# Patient Record
Sex: MALE
[De-identification: names, ages, dates, MRNs, and addresses within clinical notes are randomized per-mention and may not be internally consistent; named-entity substitution may affect disease eponyms.]

---

## 2020-06-27 ENCOUNTER — HOSPITAL ENCOUNTER (EMERGENCY)
Dept: HOSPITAL 41 - JD.ED | Age: 30
Discharge: HOME | End: 2020-06-27
Payer: COMMERCIAL

## 2020-06-27 DIAGNOSIS — H10.31: ICD-10-CM

## 2020-06-27 DIAGNOSIS — F17.210: ICD-10-CM

## 2020-06-27 DIAGNOSIS — S05.01XA: Primary | ICD-10-CM
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
## 2020-06-27 PROCEDURE — 99283 EMERGENCY DEPT VISIT LOW MDM: CPT

## 2020-06-27 NOTE — EDM.PDOC
ED HPI GENERAL MEDICAL PROBLEM





- General


Chief Complaint: Eye Problems


Stated Complaint: PIECE OF ROCK IN THIRD EYE


Time Seen by Provider: 06/27/20 22:52


Source of Information: Reports: Patient


History Limitations: Reports: No Limitations





- History of Present Illness


INITIAL COMMENTS - FREE TEXT/NARRATIVE: 





This is a 30-year-old male.  He says on Thursday he might of gotten some sand in

his eye or something.  He thought he got it out but it is continued to bother 

him since that time and he comes to the ER this evening complaining of pain and 

discharge from his right eye.  He says his eye is very sensitive to light.  He 

denies any other acute symptoms.  He has continued to rub it because of the pain

and discomfort.  Is a little bit of drainage on his eyelashes on that right eye.

 His left eye is normal.





- Related Data


                                    Allergies











Allergy/AdvReac Type Severity Reaction Status Date / Time


 


No Known Allergies Allergy   Verified 06/27/20 22:37














Past Medical History


HEENT History: Reports: Impaired Vision





- Infectious Disease History


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





Social & Family History





- Family History


Family Medical History: Noncontributory





- Tobacco Use


Smoking Status *Q: Current Every Day Smoker


Years of Tobacco use: 10


Packs/Tins Daily: 0.1





ED ROS GENERAL





- Review of Systems


Review Of Systems: See Below


Constitutional: Denies: Fever, Chills


HEENT: Reports: Eye Discharge, Eye Pain


Respiratory: Reports: No Symptoms


Cardiovascular: Reports: No Symptoms


Endocrine: Reports: No Symptoms


GI/Abdominal: Reports: No Symptoms


: Reports: No Symptoms


Musculoskeletal: Reports: No Symptoms


Skin: Reports: No Symptoms


Neurological: Reports: No Symptoms


Psychiatric: Reports: No Symptoms


Hematologic/Lymphatic: Reports: No Symptoms





ED EXAM GENERAL W FULL EYE





- Physical Exam


Exam: See Below


Exam Limited By: No Limitations


General Appearance: Alert, WD/WN, No Apparent Distress


Eye Exam: Bilateral Eye: Other (The right eye does have some discharge on his 

eyelashes, it is sensitive to light, after proparacaine and numbing the he was 

able to open it and he can see what color my eyes are, i.e. everted the lids 

there is no foreign body in the upper or lower lid, there does not appear to be 

any foreign body on the cornea with ophthalmology exam, I placed fluorescein 

stain he has a 3 mm abrasion at the 6 o'clock position just below the center of 

the vision of the cornea, he also has some abrasions on the white part of the 

globe inferiorly.)


Pupils: Normal Accommodation


Pupillary Size: Bilateral: 3 mm


Pupillary Reaction: Bilateral: Brisk


Ears: Normal External Exam


Nose: Normal Inspection


Throat/Mouth: Normal Lips, Normal Voice, No Airway Compromise


Head: Normocephalic


Neck: Supple


Respiratory/Chest: No Respiratory Distress


Back Exam: Full Range of Motion


Extremities: Normal Inspection, Normal Range of Motion


Neurological: Alert, Oriented


Psychiatric: Normal Affect, Normal Mood


Skin Exam: Warm, Dry





Course





- Vital Signs


Last Recorded V/S: 





                                Last Vital Signs











Temp  99 F   06/27/20 22:38


 


Pulse  104 H  06/27/20 22:38


 


Resp  18   06/27/20 22:38


 


BP  147/85 H  06/27/20 22:38


 


Pulse Ox  94 L  06/27/20 22:38














- Orders/Labs/Meds


Meds: 





Medications














Discontinued Medications














Generic Name Dose Route Start Last Admin





  Trade Name Joselito  PRN Reason Stop Dose Admin


 


Fluorescein Sodium  1 mg  06/27/20 22:57 





  Ful-Dannielle  EYERT  06/27/20 22:58 





  ONETIME ONE  














Departure





- Departure


Time of Disposition: 23:15


Disposition: Home, Self-Care 01


Condition: Fair


Clinical Impression: 


Right corneal abrasion


Qualifiers:


 Encounter type: initial encounter Qualified Code(s): S05.01XA - Injury of 

conjunctiva and corneal abrasion without foreign body, right eye, initial 

encounter





Right conjunctivitis


Qualifiers:


 Conjunctivitis type: acute Acute conjunctivitis type: unspecified Qualified 

Code(s): H10.31 - Unspecified acute conjunctivitis, right eye








- Discharge Information


*PRESCRIPTION DRUG MONITORING PROGRAM REVIEWED*: Not Applicable


*COPY OF PRESCRIPTION DRUG MONITORING REPORT IN PATIENT MICHELLE: Not Applicable


Instructions:  Corneal Abrasion, Easy-to-Read


Referrals: 


PCP,None [Primary Care Provider] - 


Forms:  ED Department Discharge, ED Return to Work/School Form


Additional Instructions: 


Leave the eye patch on until Sunday evening then you may take it off, Sunday get

some artificial tears at [employer] and once you take the eye patch off start using

them as often as you need for the eye irritation, do not rub your eye because it

will continue the abrasion to that eye, on Monday follow-up with the eye clinic 

to make certain this is healing, return to the ER if needed





Sepsis Event Note (ED)





- Evaluation


Sepsis Screening Result: No Definite Risk





- Focused Exam


Vital Signs: 





                                   Vital Signs











  Temp Pulse Resp BP Pulse Ox


 


 06/27/20 22:38  99 F  104 H  18  147/85 H  94 L

## 2021-05-10 NOTE — CR
Indication:



Pain



Comparison:



None



Technique:



AP and lateral views lumbar spine were obtained



Findings:



The lumbar vertebral body heights are grossly maintained with 

mild-to-moderate degenerative disc height loss and marginal osteophyte 

formation. There is no significant spondylolisthesis.



There is no displaced fracture. The facets are well-aligned.



The soft tissues are unremarkable.



Impression:



Mild degenerative and spasmodic changes of the lumbar spine without acute 

osseous abnormality.



Dictated by Kyaw Vicente MD @ 5/10/2021 1:00:21 PM



Signed by Dr. Kyaw Vicente @ May 10 2021  1:00PM

## 2021-05-10 NOTE — EDM.PDOC
ED HPI GENERAL MEDICAL PROBLEM





- General


Chief Complaint: Back Pain or Injury


Stated Complaint: BACK PAIN X3DAYS


Time Seen by Provider: 05/10/21 11:52


Source of Information: Reports: Patient


History Limitations: Reports: No Limitations





- History of Present Illness


INITIAL COMMENTS - FREE TEXT/NARRATIVE: 


HISTORY AND PHYSICAL:





History of present illness:


Patient is a 31-year-old male who presents to the emergency room with complaints

of low back pain.  He denies any injury, trauma or falls.  States he noticed it 

when he was getting out of bed and is worse with physical activity or movement. 

He denies the pain radiating anywhere.  Jokingly states "maybe it is just 

because I am fat".  Patient denies any fever, chills, headache, change in 

vision, syncope or near syncope. Denies any chest pain, shortness of breath or 

cough. Denies any GI or  symptoms.  Denies any testicular pain, redness or 

swelling.





Review of systems: 


As per history of present illness and below otherwise all systems reviewed and 

negative.





Past medical history: 


As per history of present illness and as reviewed below otherwise 

noncontributory.





Surgical history: 


As per history of present illness and as reviewed below otherwise 

noncontributory.





Social history: 


See social history for further information





Family history: 


As per history of present illness and as reviewed below otherwise nonc

ontributory.





Physical exam:


General: Well developed and well nourished. Alert and orientated x 3. Nontoxic 

in appearance and in no acute distress. Vital signs are stable and have been 

reviewed by me. Nursing notes were reviewed. 


HEENT: Atraumatic, normocephalic, pupils equal and reactive bilaterally, 

negative for conjunctival pallor or scleral icterus, mucous membranes moist, TMs

normal bilaterally, throat clear, neck supple, nontender, trachea midline. No 

drooling or trismus noted. No meningeal signs. No hot potato voice noted. 


Lungs: Clear to auscultation bilaterally. No wheezes, rales, or rhonchi.   Chest

nontender. Normal work of breathing, no accessory muscles used.


Heart: S1S2, regular rate and rhythm without overt murmur, gallops, or rubs. No 

JVD. No peripheral edema


Abdomen: Soft, nondistended, nontender. Normoactive bowel sounds. Negative for 

masses or costovertebral tenderness.


C-spine/Back: No pinpoint vertebral tenderness upon palpation. No crepitus, 

step-offs or obvious deformities. Patient is ambulatory into the emergency room 

without difficulty or deficit. Able to rock back on heels and walk on toes. 

Denies any urinary or fecal incontinence. Denies any numbness, tingling or 

saddle paresthesia. No concerns of serious infection, fracture or cord 

compression, or cauda equina syndrome. Deep tendon reflexes brisk bilaterally.


Skin: Intact, warm, dry. No lesions or rashes noted.


Hematologic: No petechiae or purpra. Mucosa appropriate color and normal nail 

bed color and refill.


Extremities: Atraumatic, moves all extremities per self without difficulty or 

deficits, negative for cords or calf pain. Neurovascular unremarkable.


Neuro: Awake, alert, oriented. Cranial nerves II through XII unremarkable. 

Cerebellum unremarkable. Motor and sensory unremarkable throughout. Exam 

nonfocal.


Psychiatric: Mood and affect are appropriate.  Normal thought process. Answering

questions appropriately.





Notes:


*This patient was seen and evaluated during the 2020 SARS-CoV-2 novel 

coronavirus pandemic period.  Community viral transmission is ongoing at time of

this encounter and the emergency department is operating under pandemic response

procedures.





X-ray shows mild degenerative and spasmodic changes of the lumbar spine without 

acute osseous abnormality. I have talked with the patient about today's 

findings, in addition to providing specific details for plan of care.  

Reassessment at the time of disposition demonstrates that the patient is in no 

acute distress.  The patient is stable for discharge, counseling was provided 

and we discussed in great detail signs and symptoms that would prompt them to 

return to the Emergency Department. Medication, follow up and supportive care 

measures were reviewed and discussed. Voices understanding and is agreeable to 

plan of care. Denies any further questions or concerns at this time.





Diagnostics:


Lumbar X-ray





Therapeutics:


Declines





Prescription:


Flexeril, Diclofenac





Impression: 








Plan:


1. X-ray shows mild degenerative and spasmodic changes of the lumbar spine 

without acute osseous abnormality.


2. When resting please lay on a flat firm surface. Limit your immobility to 

prevent muscle stiffness. Get up to ambulate/move around/gentle stretching 

multiple times throughout the day. May alternate heat and ice to the painful 

areas


3. Tylenol as needed for back pain. Otherwise take the prescribed Flexeril and 

diclofenac as directed. Diclofenac is an anti-inflammatory so do not take any 

additional NSAIDs with this medication, such as ibuprofen or Aleve. Flexeril as 

a muscle relaxant, this medication may cause drowsiness a do not take it will 

driving her needing to be functioning outside of the house.


4. Please follow-up with your primary care provider as we discussed. Return to 

the ED as needed and as discussed.





Definitive disposition and diagnosis as appropriate pending reevaluation and 

review of above.





  ** Back


Pain Score (Numeric/FACES): 7





- Related Data


                                    Allergies











Allergy/AdvReac Type Severity Reaction Status Date / Time


 


No Known Allergies Allergy   Verified 05/10/21 12:03











Home Meds: 


                                    Home Meds





Cyclobenzaprine [Flexeril] 10 mg PO TID PRN #21 tab 05/10/21 [Rx]


Diclofenac Sodium [Voltaren] 75 mg PO BIDMEALS PRN #30 tab.cr 05/10/21 [Rx]











Past Medical History


HEENT History: Reports: Impaired Vision





- Infectious Disease History


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





Social & Family History





- Family History


Family Medical History: No Pertinent Family History





ED ROS GENERAL





- Review of Systems


Review Of Systems: Comprehensive ROS is negative, except as noted in HPI.





ED EXAM,LOWER BACK PAIN/INJURY





- Physical Exam


Exam: See Below (See dictation)





Course





- Vital Signs


Last Recorded V/S: 


                                Last Vital Signs











Temp  98.6 F   05/10/21 12:03


 


Pulse  107 H  05/10/21 12:03


 


Resp  17   05/10/21 12:03


 


BP  139/96 H  05/10/21 12:03


 


Pulse Ox  98   05/10/21 12:03














Departure





- Departure


Time of Disposition: 13:04


Disposition: Home, Self-Care 01


Clinical Impression: 


Lumbago


Qualifiers:


 Chronicity: acute Back pain laterality: bilateral Sciatica presence: without 

sciatica Qualified Code(s): M54.5 - Low back pain








- Discharge Information


Prescriptions: 


Cyclobenzaprine [Flexeril] 10 mg PO TID PRN #21 tab


 PRN Reason: Muscle Spasm


Diclofenac Sodium [Voltaren] 75 mg PO BIDMEALS PRN #30 tab.cr


 PRN Reason: Pain


Instructions:  Acute Back Pain, Adult


Referrals: 


PCP,None [Primary Care Provider] - 


Forms:  ED Department Discharge


Additional Instructions: 


The following information is given to patients seen in the emergency department 

who are being discharged to home. This information is to outline your options 

for follow-up care. We provide all patients seen in our emergency department 

with a follow-up referral.





The need for follow-up, as well as the timing and circumstances, are variable 

depending upon the specifics of your emergency department visit.





If you don't have a primary care physician on staff, we will provide you with a 

referral. We always advise you to contact your personal physician following an 

emergency department visit to inform them of the circumstance of the visit and 

for follow-up with them and/or the need for any referrals to a consulting 

specialist.





The emergency department will also refer you to a specialist when appropriate. 

This referral assures that you have the opportunity for follow-up care with a 

specialist. All of these measure are taken in an effort to provide you with 

optimal care, which includes your follow-up.





Under all circumstances we always encourage you to contact your private 

physician who remains a resource for coordinating your care. When calling for 

follow-up care, please make the office aware that this follow-up is from your 

recent emergency room visit. If for any reason you are refused follow-up, please

contact the  Emergency Department

at [phone] and asked to speak to the emergency department charge nurse.








Primary Care


[address] [place] [address]


Phone: [phone]


Fax: [phone]





[address] [address]


Phone: [phone]


Fax: [phone]





Thank you for choosing the [facility] emergency department in 

[place] for your medical needs today.  It was a pleasure caring for you. Today

you were seen in the emergency department for back pain.





1. Your x-ray shows mild degenerative and spasmodic changes of the lumbar spine 

without acute osseous abnormality.


2. When resting please lay on a flat firm surface. Limit your immobility to 

prevent muscle stiffness. Get up to ambulate/move around/gentle stretching 

multiple times throughout the day. May alternate heat and ice to the painful 

areas


3. Tylenol as needed for back pain. Otherwise take the prescribed Flexeril and 

diclofenac as directed. Diclofenac is an anti-inflammatory so do not take any 

additional NSAIDs with this medication, such as ibuprofen or Aleve. Flexeril as 

a muscle relaxant, this medication may cause drowsiness a do not take it will 

driving her needing to be functioning outside of the house.


4. Please follow-up with your primary care provider as we discussed. Return to 

the ED as needed and as discussed.








Sepsis Event Note (ED)





- Focused Exam


Vital Signs: 


                                   Vital Signs











  Temp Pulse Resp BP Pulse Ox


 


 05/10/21 12:03  98.6 F  107 H  17  139/96 H  98

## 2023-02-17 ENCOUNTER — HOSPITAL ENCOUNTER (EMERGENCY)
Dept: HOSPITAL 56 - MW.ED | Age: 33
LOS: 1 days | Discharge: HOME | End: 2023-02-18
Payer: SELF-PAY

## 2023-02-17 DIAGNOSIS — Z72.0: ICD-10-CM

## 2023-02-17 DIAGNOSIS — E66.9: ICD-10-CM

## 2023-02-17 DIAGNOSIS — L02.215: Primary | ICD-10-CM

## 2023-02-17 LAB
BUN SERPL-MCNC: 19 MG/DL (ref 7–18)
CHLORIDE SERPL-SCNC: 102 MMOL/L (ref 98–107)
CO2 SERPL-SCNC: 25.3 MMOL/L (ref 21–32)
EGFRCR SERPLBLD CKD-EPI 2021: 81 ML/MIN (ref 60–?)
GLUCOSE SERPL-MCNC: 104 MG/DL (ref 74–106)
POTASSIUM SERPL-SCNC: 3.7 MMOL/L (ref 3.5–5.1)
SODIUM SERPL-SCNC: 139 MMOL/L (ref 136–148)

## 2023-02-17 PROCEDURE — 87205 SMEAR GRAM STAIN: CPT

## 2023-02-17 PROCEDURE — 87070 CULTURE OTHR SPECIMN AEROBIC: CPT

## 2023-02-17 PROCEDURE — 36415 COLL VENOUS BLD VENIPUNCTURE: CPT

## 2023-02-17 PROCEDURE — 80053 COMPREHEN METABOLIC PANEL: CPT

## 2023-02-17 PROCEDURE — 99283 EMERGENCY DEPT VISIT LOW MDM: CPT

## 2023-02-17 PROCEDURE — 10060 I&D ABSCESS SIMPLE/SINGLE: CPT

## 2023-02-17 PROCEDURE — 85652 RBC SED RATE AUTOMATED: CPT

## 2023-02-17 PROCEDURE — 82803 BLOOD GASES ANY COMBINATION: CPT

## 2023-02-17 PROCEDURE — 85025 COMPLETE CBC W/AUTO DIFF WBC: CPT

## 2023-02-17 PROCEDURE — 86140 C-REACTIVE PROTEIN: CPT

## 2023-08-31 ENCOUNTER — HOSPITAL ENCOUNTER (EMERGENCY)
Dept: HOSPITAL 56 - MW.ED | Age: 33
Discharge: HOME | End: 2023-08-31
Payer: COMMERCIAL

## 2023-08-31 DIAGNOSIS — E66.9: ICD-10-CM

## 2023-08-31 DIAGNOSIS — L60.0: Primary | ICD-10-CM

## 2023-08-31 DIAGNOSIS — Z86.16: ICD-10-CM

## 2023-08-31 DIAGNOSIS — Z23: ICD-10-CM

## 2023-08-31 DIAGNOSIS — Z72.0: ICD-10-CM
